# Patient Record
Sex: FEMALE | Race: BLACK OR AFRICAN AMERICAN
[De-identification: names, ages, dates, MRNs, and addresses within clinical notes are randomized per-mention and may not be internally consistent; named-entity substitution may affect disease eponyms.]

---

## 2018-09-24 ENCOUNTER — HOSPITAL ENCOUNTER (EMERGENCY)
Dept: HOSPITAL 72 - EMR | Age: 48
Discharge: HOME | End: 2018-09-24
Payer: MEDICAID

## 2018-09-24 VITALS — DIASTOLIC BLOOD PRESSURE: 80 MMHG | SYSTOLIC BLOOD PRESSURE: 125 MMHG

## 2018-09-24 VITALS — WEIGHT: 135 LBS | BODY MASS INDEX: 21.69 KG/M2 | HEIGHT: 66 IN

## 2018-09-24 VITALS — DIASTOLIC BLOOD PRESSURE: 74 MMHG | SYSTOLIC BLOOD PRESSURE: 115 MMHG

## 2018-09-24 DIAGNOSIS — M54.12: ICD-10-CM

## 2018-09-24 DIAGNOSIS — J40: Primary | ICD-10-CM

## 2018-09-24 LAB
ADD MANUAL DIFF: NO
ALBUMIN SERPL-MCNC: 3.4 G/DL (ref 3.4–5)
ALBUMIN/GLOB SERPL: 1.1 {RATIO} (ref 1–2.7)
ALP SERPL-CCNC: 34 U/L (ref 46–116)
ALT SERPL-CCNC: 17 U/L (ref 12–78)
ANION GAP SERPL CALC-SCNC: 5 MMOL/L (ref 5–15)
AST SERPL-CCNC: 13 U/L (ref 15–37)
BASOPHILS NFR BLD AUTO: 1.5 % (ref 0–2)
BILIRUB SERPL-MCNC: 0.3 MG/DL (ref 0.2–1)
BUN SERPL-MCNC: 9 MG/DL (ref 7–18)
CALCIUM SERPL-MCNC: 8.5 MG/DL (ref 8.5–10.1)
CHLORIDE SERPL-SCNC: 106 MMOL/L (ref 98–107)
CK MB SERPL-MCNC: < 0.5 NG/ML (ref 0–3.6)
CK SERPL-CCNC: 81 U/L (ref 26–308)
CO2 SERPL-SCNC: 26 MMOL/L (ref 21–32)
CREAT SERPL-MCNC: 0.8 MG/DL (ref 0.55–1.3)
EOSINOPHIL NFR BLD AUTO: 11.3 % (ref 0–3)
ERYTHROCYTE [DISTWIDTH] IN BLOOD BY AUTOMATED COUNT: 10.6 % (ref 11.6–14.8)
GLOBULIN SER-MCNC: 3.1 G/DL
HCT VFR BLD CALC: 39.7 % (ref 37–47)
HGB BLD-MCNC: 13.5 G/DL (ref 12–16)
LYMPHOCYTES NFR BLD AUTO: 18.6 % (ref 20–45)
MCV RBC AUTO: 103 FL (ref 80–99)
MONOCYTES NFR BLD AUTO: 5.2 % (ref 1–10)
NEUTROPHILS NFR BLD AUTO: 63.4 % (ref 45–75)
PLATELET # BLD: 198 K/UL (ref 150–450)
POTASSIUM SERPL-SCNC: 4.2 MMOL/L (ref 3.5–5.1)
RBC # BLD AUTO: 3.85 M/UL (ref 4.2–5.4)
SODIUM SERPL-SCNC: 137 MMOL/L (ref 136–145)
WBC # BLD AUTO: 6.2 K/UL (ref 4.8–10.8)

## 2018-09-24 PROCEDURE — 80053 COMPREHEN METABOLIC PANEL: CPT

## 2018-09-24 PROCEDURE — 85025 COMPLETE CBC W/AUTO DIFF WBC: CPT

## 2018-09-24 PROCEDURE — 94664 DEMO&/EVAL PT USE INHALER: CPT

## 2018-09-24 PROCEDURE — 82553 CREATINE MB FRACTION: CPT

## 2018-09-24 PROCEDURE — 83880 ASSAY OF NATRIURETIC PEPTIDE: CPT

## 2018-09-24 PROCEDURE — 82550 ASSAY OF CK (CPK): CPT

## 2018-09-24 PROCEDURE — 85379 FIBRIN DEGRADATION QUANT: CPT

## 2018-09-24 PROCEDURE — 84484 ASSAY OF TROPONIN QUANT: CPT

## 2018-09-24 PROCEDURE — 83690 ASSAY OF LIPASE: CPT

## 2018-09-24 PROCEDURE — 36415 COLL VENOUS BLD VENIPUNCTURE: CPT

## 2018-09-24 PROCEDURE — 94640 AIRWAY INHALATION TREATMENT: CPT

## 2018-09-24 PROCEDURE — 99284 EMERGENCY DEPT VISIT MOD MDM: CPT

## 2018-09-24 PROCEDURE — 80307 DRUG TEST PRSMV CHEM ANLYZR: CPT

## 2018-09-24 PROCEDURE — 93005 ELECTROCARDIOGRAM TRACING: CPT

## 2018-09-24 PROCEDURE — 81025 URINE PREGNANCY TEST: CPT

## 2018-09-24 PROCEDURE — 71045 X-RAY EXAM CHEST 1 VIEW: CPT

## 2018-09-24 NOTE — CARDIOLOGY REPORT
--------------- APPROVED REPORT --------------





EKG Measurement

Heart [id number]

[id number]

[id number]

[id number]

[id number]





Normal sinus rhythm

Possible Left atrial enlargement

Borderline ECG

## 2018-09-24 NOTE — EMERGENCY ROOM REPORT
History of Present Illness


General


Chief Complaint:  Chest Pain


Source:  Patient





Present Illness


HPI


Patient 47-year-old female who presented after increased chest discomfort.  

Patient reports having pain for the past 3 days.  The patient was having the 

pain which was constant in nature.  She had associated left arm numbness.  She 

denies any fever.  She reports having some neck discomfort.  Pain was noted to 

be constant in her left arm for the past 3 days.


Allergies:  


Coded Allergies:  


     TETRACYCLINE (Verified  Adverse Reaction, Intermediate, 9/24/18)





Patient History


Pregnant Now:  No


Reviewed Nursing Documentation:  PMH: Agreed; PSxH: Agreed





Nursing Documentation-PMH


Past Medical History:  No History, Except For


Hx Hypertension:  No


Hx COPD:  No





Review of Systems


All Other Systems:  negative except mentioned in HPI





Physical Exam





Vital Signs








  Date Time  Temp Pulse Resp B/P (MAP) Pulse Ox O2 Delivery O2 Flow Rate FiO2


 


9/24/18 08:30 98.1 79 16 115/74 94 Room Air  





 98.1       








Sp02 EP Interpretation:  reviewed, normal


General Appearance:  normal inspection, well appearing, no apparent distress, 

alert, GCS 15


Head:  atraumatic


ENT:  normal ENT inspection, hearing grossly normal, normal voice


Neck:  normal inspection, full range of motion, supple, no bony tend


Respiratory:  normal inspection, no respiratory distress, no retraction, other 

- prolonged expirations


Cardiovascular #1:  regular rate, rhythm, no edema


Gastrointestinal:  normal inspection, normal bowel sounds, non tender, soft, no 

guarding, no hernia


Genitourinary:  no CVA tenderness


Musculoskeletal:  normal inspection, back normal, normal range of motion


Neurologic:  normal inspection, alert, responsive, speech normal


Psychiatric:  normal inspection, judgement/insight normal, mood/affect normal


Skin:  normal inspection, normal color, no rash





Medical Decision Making


Diagnostic Impression:  


 Primary Impression:  


 Cervical radiculopathy


 Additional Impression:  


 Bronchitis


ER Course


Patient presented for chest pain.  Differential diagnosis included but was not 

limited to acute coronary syndrome, pulmonary embolism, pneumonia, aortic 

dissection, shingles, pneumothorax, aortic dissection, esophageal rupture, 

pericarditis. Because of complexity of patient's case laboratory testing and 

imaging studies were ordered.


Given the patient's constant pain for several days.  The with a negative 

troponin the patient is essentially ruled out for myocardial infarction.  The 

patient was given breathing treatment with improvement in her symptoms.  She 

was the advised follow-up with her primary care physician for reexamination and 

treatment.The patient was additionally given a prescription for clotrimazole 

for what appears to be a skin yeast infection.The patient is advised to follow 

up with  primary care doctor in 1-2 days.  Patient is advised to return if any 

worsening condition or if any changes in status that are concerning.





This report is dictated with Dragon transcription software which may 

occasionally lead to discrepancies related to use of this software.





Labs








Test


  9/24/18


08:44 9/24/18


09:15


 


White Blood Count


  6.2 K/UL


(4.8-10.8) 


 


 


Red Blood Count


  3.85 M/UL


(4.20-5.40) 


 


 


Hemoglobin


  13.5 G/DL


(12.0-16.0) 


 


 


Hematocrit


  39.7 %


(37.0-47.0) 


 


 


Mean Corpuscular Volume 103 FL (80-99)  


 


Mean Corpuscular Hemoglobin


  34.9 PG


(27.0-31.0) 


 


 


Mean Corpuscular Hemoglobin


Concent 34.0 G/DL


(32.0-36.0) 


 


 


Red Cell Distribution Width


  10.6 %


(11.6-14.8) 


 


 


Platelet Count


  198 K/UL


(150-450) 


 


 


Mean Platelet Volume


  6.9 FL


(6.5-10.1) 


 


 


Neutrophils (%) (Auto)


  63.4 %


(45.0-75.0) 


 


 


Lymphocytes (%) (Auto)


  18.6 %


(20.0-45.0) 


 


 


Monocytes (%) (Auto)


  5.2 %


(1.0-10.0) 


 


 


Eosinophils (%) (Auto)


  11.3 %


(0.0-3.0) 


 


 


Basophils (%) (Auto)


  1.5 %


(0.0-2.0) 


 


 


D-Dimer


  < 0.19 mg/L


FEU 


 


 


Sodium Level


  137 MMOL/L


(136-145) 


 


 


Potassium Level


  4.2 MMOL/L


(3.5-5.1) 


 


 


Chloride Level


  106 MMOL/L


() 


 


 


Carbon Dioxide Level


  26 MMOL/L


(21-32) 


 


 


Anion Gap


  5 mmol/L


(5-15) 


 


 


Blood Urea Nitrogen 9 mg/dL (7-18)  


 


Creatinine


  0.8 MG/DL


(0.55-1.30) 


 


 


Estimat Glomerular Filtration


Rate > 60 mL/min


(>60) 


 


 


Glucose Level


  100 MG/DL


() 


 


 


Calcium Level


  8.5 MG/DL


(8.5-10.1) 


 


 


Total Bilirubin


  0.3 MG/DL


(0.2-1.0) 


 


 


Aspartate Amino Transf


(AST/SGOT) 13 U/L (15-37) 


  


 


 


Alanine Aminotransferase


(ALT/SGPT) 17 U/L (12-78) 


  


 


 


Alkaline Phosphatase


  34 U/L


() 


 


 


Total Creatine Kinase


  81 U/L


() 


 


 


Creatine Kinase MB


  < 0.5 NG/ML


(0.0-3.6) 


 


 


Creatine Kinase MB Relative


Index 0.6 


  


 


 


Troponin I


  0.005 ng/mL


(0.000-0.056) 


 


 


Pro-B-Type Natriuretic Peptide


  119 pg/mL


(0-125) 


 


 


Total Protein


  6.5 G/DL


(6.4-8.2) 


 


 


Albumin


  3.4 G/DL


(3.4-5.0) 


 


 


Globulin 3.1 g/dL  


 


Albumin/Globulin Ratio 1.1 (1.0-2.7)  


 


Lipase


  80 U/L


() 


 


 


Urine HCG, Qualitative


  


  Negative


(NEGATIVE)


 


Urine Opiates Screen


  


  Negative


(NEGATIVE)


 


Urine Barbiturates Screen


  


  Negative


(NEGATIVE)


 


Phencyclidine (PCP) Screen


  


  Negative


(NEGATIVE)


 


Urine Amphetamines Screen


  


  Negative


(NEGATIVE)


 


Urine Benzodiazepines Screen


  


  Negative


(NEGATIVE)


 


Urine Cocaine Screen


  


  Negative


(NEGATIVE)


 


Urine Marijuana (THC) Screen


  


  Positive


(NEGATIVE)








Chest X-Ray Diagnostic Results


Chest X-Ray Diagnostic Results :  


   Chest X-Ray Ordered:  Yes


   # of Views/Limited/Complete:  1 View


   Indication:  Chest Pain


   EP Interpretation:  Yes


   Interpretation:  no consolidation


   Impression:  No acute disease


   Electronically Signed by:  Electronically signed by Dr. Lev Davila M.D.





Last Vital Signs








  Date Time  Temp Pulse Resp B/P (MAP) Pulse Ox O2 Delivery O2 Flow Rate FiO2


 


9/24/18 08:39 98.8 70 16 115/74 94 Room Air  





 98.8       








Status:  improved


Disposition:  HOME, SELF-CARE


Condition:  Stable


Scripts


Clotrimazole* (LOTRIMIN*) 15 Gm Cream..g.


1 APPLIC TOPIC TWICE A DAY, #15 GM


   Prov: Lev Davila MD         9/24/18 


Albuterol Sulfate* (ALBUTEROL SULFATE MDI*) 8.5 Gm Hfa.aer.ad


2 PUFF INH Q4H PRN for cough/wheezing, #1 EA 0 Refills


   Prov: Lev Davila MD         9/24/18 


Prednisone* (PREDNISONE*) 20 Mg Tablet


40 MG ORAL DAILY, #10 TAB


   Prov: Lev Davila MD         9/24/18


Referrals:  


[facility]ERR[facility] (PCP)











Lev Davila MD Sep 24, 2018 09:27

## 2018-09-24 NOTE — DIAGNOSTIC IMAGING REPORT
Indication: Chest pain

 

Technique: XRAY Chest 1v

 

Comparison: None

 

Findings: Heart size and mediastinal contours are within normal limits for AP

technique. There is no focal consolidation, pneumothorax or pleural effusion. Osseous

structures demonstrate no acute abnormality.

 

Impression: No radiographic evidence of acute cardiopulmonary disease.